# Patient Record
Sex: FEMALE | Race: WHITE | URBAN - METROPOLITAN AREA
[De-identification: names, ages, dates, MRNs, and addresses within clinical notes are randomized per-mention and may not be internally consistent; named-entity substitution may affect disease eponyms.]

---

## 2022-06-10 ENCOUNTER — OFFICE VISIT (OUTPATIENT)
Dept: URBAN - METROPOLITAN AREA CLINIC 46 | Facility: CLINIC | Age: 67
End: 2022-06-10
Payer: COMMERCIAL

## 2022-06-10 DIAGNOSIS — H40.023 OPEN ANGLE WITH BORDERLINE FINDINGS, HIGH RISK, BILATERAL: Primary | ICD-10-CM

## 2022-06-10 PROCEDURE — 76514 ECHO EXAM OF EYE THICKNESS: CPT | Performed by: OPHTHALMOLOGY

## 2022-06-10 PROCEDURE — 92004 COMPRE OPH EXAM NEW PT 1/>: CPT | Performed by: OPHTHALMOLOGY

## 2022-06-10 PROCEDURE — 92083 EXTENDED VISUAL FIELD XM: CPT | Performed by: OPHTHALMOLOGY

## 2022-06-10 PROCEDURE — 92020 GONIOSCOPY: CPT | Performed by: OPHTHALMOLOGY

## 2022-06-10 PROCEDURE — 92133 CPTRZD OPH DX IMG PST SGM ON: CPT | Performed by: OPHTHALMOLOGY

## 2022-06-10 ASSESSMENT — INTRAOCULAR PRESSURE
OD: 19
OS: 19

## 2022-06-10 NOTE — IMPRESSION/PLAN
Impression: Open angle with borderline findings, high risk, bilateral: H40.023. Plan: Optic nerve appears glaucomatous according to OCT ON, VF is unrealizable at this time. Will need to repeat VF for conformation.  Suspect may need to start on IOP lowering therapy but need confirmation first.

## 2022-10-28 ENCOUNTER — OFFICE VISIT (OUTPATIENT)
Dept: URBAN - METROPOLITAN AREA CLINIC 46 | Facility: CLINIC | Age: 67
End: 2022-10-28
Payer: MEDICARE

## 2022-10-28 DIAGNOSIS — H40.023 OPEN ANGLE WITH BORDERLINE FINDINGS, HIGH RISK, BILATERAL: Primary | ICD-10-CM

## 2022-10-28 PROCEDURE — 99213 OFFICE O/P EST LOW 20 MIN: CPT | Performed by: OPHTHALMOLOGY

## 2022-10-28 PROCEDURE — 92083 EXTENDED VISUAL FIELD XM: CPT | Performed by: OPHTHALMOLOGY

## 2022-10-28 ASSESSMENT — INTRAOCULAR PRESSURE
OS: 18
OD: 18

## 2022-10-28 NOTE — IMPRESSION/PLAN
Impression: Open angle with borderline findings, high risk, bilateral: H40.023. Plan: IOP within good range OU. At this point, glaucoma is not apparent, however, does have ON difference. Will monitor closely.